# Patient Record
Sex: MALE | URBAN - METROPOLITAN AREA
[De-identification: names, ages, dates, MRNs, and addresses within clinical notes are randomized per-mention and may not be internally consistent; named-entity substitution may affect disease eponyms.]

---

## 2023-06-05 ENCOUNTER — OFFICE VISIT (OUTPATIENT)
Dept: URBAN - METROPOLITAN AREA CLINIC 6 | Facility: CLINIC | Age: 43
End: 2023-06-05

## 2023-06-05 DIAGNOSIS — H43.313 VITREOUS MEMBRANES AND STRANDS, BILATERAL: ICD-10-CM

## 2023-06-05 DIAGNOSIS — H35.411 LATTICE DEGENERATION OF RETINA OF RIGHT EYE: Primary | ICD-10-CM

## 2023-06-05 DIAGNOSIS — H18.823 CORNEAL DISORDER DUE TO CONTACT LENS, BILATERAL: ICD-10-CM

## 2023-06-05 PROCEDURE — 92004 COMPRE OPH EXAM NEW PT 1/>: CPT | Performed by: OPTOMETRIST

## 2023-06-05 RX ORDER — PREDNISOLONE ACETATE 10 MG/ML
1 % SUSPENSION/ DROPS OPHTHALMIC
Qty: 10 | Refills: 0 | Status: ACTIVE
Start: 2023-06-05

## 2023-06-05 ASSESSMENT — INTRAOCULAR PRESSURE
OD: 12
OS: 15

## 2023-06-05 NOTE — IMPRESSION/PLAN
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 Plan: Start Prednisolone acetate TID OU (shake well) until next visit. May consider slow taper at next visit. Advised CL holiday. Discussed importance of not sleeping with contact lenses and changing them as directed. Per patient, does not have prescription glasses but will be getting them this week- advised to use CL part time and to use Prednisolone 15 minutes prior to placing contact lenses and to remove contact lenses during his lunch break at work put a drop of Prednisolone then put contact lenses back in after 15 minutes. Also recommended Clear Care cleaning solution. Will consider LASIK evaluation once corneas are healed.

## 2023-06-05 NOTE — IMPRESSION/PLAN
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 Plan: Lattice degeneration with sealed atrophic holes OD - lattice and the increased risk of retinal tears and detachment discussed as a high myope. Retinal detachment symptoms discussed, and patient agreed to return immediately if new symptoms develop.